# Patient Record
(demographics unavailable — no encounter records)

---

## 2024-11-05 NOTE — PROCEDURE
[FreeTextEntry1] : Patient was injected with Botox diluted 10 units/0.1cc. Neck region was cleaned with alcohol wipes. Injections were done using anatomical targets as follows:  Right sternocleidomastoid 60units divided in 2 spots  Right trapezius 30 units  Right splenius capitis 50 Right semispinalis capitis 20   Patient tolerated the procedure well, no complications. total injected 160 units wasted 40

## 2024-11-05 NOTE — PHYSICAL EXAM
[FreeTextEntry1] : On exam patient is awake and alert. He has mild hypertrophy of the right sternocleidomastoid. He has limited range of motion worse on the right. Strength is 5 x 5 in all extremities. Face is symmetric tongue and uvula midline and symmetric. No difficulty with tandem walk. Deep tendon reflexes are equal and symmetric.

## 2024-11-05 NOTE — DISCUSSION/SUMMARY
[FreeTextEntry1] : 62 year-old right-handed male with cervical dystonia, carpal tunnel syndrome and chronic lower back pain. He continues to receive benefit from Botox,   Patient is injected as above, no changes made today. All questions were addressed and answered Follow up in 3 months for repeat injections. We discussed the above impression, plan and recommendation during the visit. Counseling represented more than 50% of the 20-minute visit time

## 2024-11-05 NOTE — HISTORY OF PRESENT ILLNESS
[FreeTextEntry1] : The patient presented for Botox injections today for cervical dystonia. In past, he reported tightness and difficulty turning his head in both directions, right more than left. No side effects from the last injection. He reports being able to turn his head much better, particularly noticeable when he is driving. He also reports less pain in  the right shoulder region and base of the head on the right. EMG study mild carpal tunnel syndrome. no cervical radiculopathy. the effect of BTX has now worn off he presents for repeat BTX inj gabapentin 200mgs daily  interval history: December 15, 2021.  Good benefit from last inj, no side effects.  Botox is now worn off he presents for repeat injections.  Patient states that he is 15 days over 3 months and notices tingling sensations in his neck and scalp when Botox wears off.  Tolerating gabapentin without any side effects  Interval history-March 2, 2022.  Patient states he had good response to the last Botox injection however it lasted only 2 months.  Thereafter he he has difficulty turning his neck and also paresthesias on his scalp.  He feels like there is pressure on the top of his head.  When the symptoms happen he takes gabapentin and has been using about 400 mg a day.  He takes 200 mg twice a day.  States that if he takes it late at night it causes insomnia.  Denies any other complaints at present time  Interval history-January 10, 2023- patient states the last set of Botox injections worked very well and lasted a little less than 3 months.  There were no side effects.  He is using gabapentin consistently 200 mg a day.  He is exercising and overall feeling well.  He wishes to have repeat injections done today.  When Botox wears off he gets paresthesias in the right side of his head and difficulty moving his head side to side and finds it very difficult to the point that he cannot drive anymore.  He has tried increasing gabapentin to 300 mg and that helps with those symptoms, however if he takes the 300 mg 3 capsules together he feels stomach cramps  Interval history April 18, 2023 patient presents for treatment and management of cervical dystonia.  Requesting slightly higher dose as when the Botox wears off his symptoms return.  Takes gabapentin only once a day now.  Denies any other complaints  Interval history August 25, 2023.  Patient presents for treatment and management of cervical dystonia.  Higher dose of Botox worked better for him.  Just had some sensory tingling about 3 days ago takes gabapentin only once a day denies any other complaints at present time no side effects from Botox  may 28, 2024 patient present for the management of cervical dystonia.  States that the last set of injection worked well for him and wore off few days ago.  He takes gabapentin once a day.  Denies any side effects from Botox and wishes to have repeat injections done today.  Has a new baby granddaughter Ana Mayo nov 5, 2024: patient present for the management of cervical dystonia.  States that the last set of injection worked well for him and wore off 20 days ago.. Not taking gabapentin anymore.  Denies any side effects from Botox and wishes to have repeat injections done today.

## 2025-02-27 NOTE — DISCUSSION/SUMMARY
[FreeTextEntry1] : 62 year-old right-handed male with cervical dystonia, carpal tunnel syndrome and chronic lower back pain. He continues to receive benefit from Botox,   Patient is injected as above, no changes made today. Uses gabapentin as needed All questions were addressed and answered Follow up in 3 months for repeat injections. We discussed the above impression, plan and recommendation during the visit. Counseling represented more than 50% of the 20-minute visit time

## 2025-02-27 NOTE — HISTORY OF PRESENT ILLNESS
[FreeTextEntry1] : The patient presented for Botox injections today for cervical dystonia. In past, he reported tightness and difficulty turning his head in both directions, right more than left. No side effects from the last injection. He reports being able to turn his head much better, particularly noticeable when he is driving. He also reports less pain in  the right shoulder region and base of the head on the right. EMG study mild carpal tunnel syndrome. no cervical radiculopathy. the effect of BTX has now worn off he presents for repeat BTX inj gabapentin 200mgs daily  interval history: December 15, 2021.  Good benefit from last inj, no side effects.  Botox is now worn off he presents for repeat injections.  Patient states that he is 15 days over 3 months and notices tingling sensations in his neck and scalp when Botox wears off.  Tolerating gabapentin without any side effects  Interval history-March 2, 2022.  Patient states he had good response to the last Botox injection however it lasted only 2 months.  Thereafter he he has difficulty turning his neck and also paresthesias on his scalp.  He feels like there is pressure on the top of his head.  When the symptoms happen he takes gabapentin and has been using about 400 mg a day.  He takes 200 mg twice a day.  States that if he takes it late at night it causes insomnia.  Denies any other complaints at present time  Interval history-January 10, 2023- patient states the last set of Botox injections worked very well and lasted a little less than 3 months.  There were no side effects.  He is using gabapentin consistently 200 mg a day.  He is exercising and overall feeling well.  He wishes to have repeat injections done today.  When Botox wears off he gets paresthesias in the right side of his head and difficulty moving his head side to side and finds it very difficult to the point that he cannot drive anymore.  He has tried increasing gabapentin to 300 mg and that helps with those symptoms, however if he takes the 300 mg 3 capsules together he feels stomach cramps  Interval history April 18, 2023 patient presents for treatment and management of cervical dystonia.  Requesting slightly higher dose as when the Botox wears off his symptoms return.  Takes gabapentin only once a day now.  Denies any other complaints  Interval history August 25, 2023.  Patient presents for treatment and management of cervical dystonia.  Higher dose of Botox worked better for him.  Just had some sensory tingling about 3 days ago takes gabapentin only once a day denies any other complaints at present time no side effects from Botox  may 28, 2024 patient present for the management of cervical dystonia.  States that the last set of injection worked well for him and wore off few days ago.  He takes gabapentin once a day.  Denies any side effects from Botox and wishes to have repeat injections done today.  Has a new baby granddaughter Ana  Interval nov 5, 2024: patient present for the management of cervical dystonia.  States that the last set of injection worked well for him and wore off 20 days ago.. Not taking gabapentin anymore.  Denies any side effects from Botox and wishes to have repeat injections done today.   Interval history February 27, 2025.  Patient presents for Botox injections for cervical dystonia.  States that he is 4 months out and last month Botox wore off.  He started taking gabapentin to help with the pain.  States that otherwise the injections worked well and wishes to have repeat injections done today.

## 2025-05-21 NOTE — PROCEDURE
[FreeTextEntry1] : Patient was injected with Botox diluted 10 units/0.1cc. Neck region was cleaned with alcohol wipes. Injections were done using anatomical targets as follows:  Right sternocleidomastoid 70units divided in 2 spots (+10 c/w last inj) Right trapezius 40 units (+10 c/w last inj) Right splenius capitis 60 (+10 c/w last inj) Right semispinalis capitis 30  (+10 c/w last inj)  Patient tolerated the procedure well, no complications. total injected 200 units wasted 0

## 2025-05-21 NOTE — DISCUSSION/SUMMARY
[FreeTextEntry1] : 62 year-old right-handed male with cervical dystonia, carpal tunnel syndrome and chronic lower back pain. He continues to receive benefit from Botox,   Patient is injected as above,dose increased Uses gabapentin as needed, baclofen 10 bid as needed side effects of sedation discussed extensively All questions were addressed and answered Follow up in 3 months for repeat injections. We discussed the above impression, plan and recommendation during the visit. Counseling represented more than 50% of the 30-minute visit time

## 2025-05-21 NOTE — HISTORY OF PRESENT ILLNESS
[FreeTextEntry1] : The patient presented for Botox injections today for cervical dystonia. In past, he reported tightness and difficulty turning his head in both directions, right more than left. No side effects from the last injection. He reports being able to turn his head much better, particularly noticeable when he is driving. He also reports less pain in  the right shoulder region and base of the head on the right. EMG study mild carpal tunnel syndrome. no cervical radiculopathy. the effect of BTX has now worn off he presents for repeat BTX inj gabapentin 200mgs daily  interval history: December 15, 2021.  Good benefit from last inj, no side effects.  Botox is now worn off he presents for repeat injections.  Patient states that he is 15 days over 3 months and notices tingling sensations in his neck and scalp when Botox wears off.  Tolerating gabapentin without any side effects  Interval history-March 2, 2022.  Patient states he had good response to the last Botox injection however it lasted only 2 months.  Thereafter he he has difficulty turning his neck and also paresthesias on his scalp.  He feels like there is pressure on the top of his head.  When the symptoms happen he takes gabapentin and has been using about 400 mg a day.  He takes 200 mg twice a day.  States that if he takes it late at night it causes insomnia.  Denies any other complaints at present time  Interval history-January 10, 2023- patient states the last set of Botox injections worked very well and lasted a little less than 3 months.  There were no side effects.  He is using gabapentin consistently 200 mg a day.  He is exercising and overall feeling well.  He wishes to have repeat injections done today.  When Botox wears off he gets paresthesias in the right side of his head and difficulty moving his head side to side and finds it very difficult to the point that he cannot drive anymore.  He has tried increasing gabapentin to 300 mg and that helps with those symptoms, however if he takes the 300 mg 3 capsules together he feels stomach cramps  Interval history April 18, 2023 patient presents for treatment and management of cervical dystonia.  Requesting slightly higher dose as when the Botox wears off his symptoms return.  Takes gabapentin only once a day now.  Denies any other complaints  Interval history August 25, 2023.  Patient presents for treatment and management of cervical dystonia.  Higher dose of Botox worked better for him.  Just had some sensory tingling about 3 days ago takes gabapentin only once a day denies any other complaints at present time no side effects from Botox  may 28, 2024 patient present for the management of cervical dystonia.  States that the last set of injection worked well for him and wore off few days ago.  He takes gabapentin once a day.  Denies any side effects from Botox and wishes to have repeat injections done today.  Has a new baby granddaughter Ana  Interval nov 5, 2024: patient present for the management of cervical dystonia.  States that the last set of injection worked well for him and wore off 20 days ago.. Not taking gabapentin anymore.  Denies any side effects from Botox and wishes to have repeat injections done today.   Interval history February 27, 2025.  Patient presents for Botox injections for cervical dystonia.  States that he is 4 months out and last month Botox wore off.  He started taking gabapentin to help with the pain.  States that otherwise the injections worked well and wishes to have repeat injections done today.  Interval history May 21, 2025.  Patient presents for Botox injections for cervical dystonia.  States that the last injection lasted only 2 months and then he had return of symptoms.  No side effects requests a higher dose today.  He takes gabapentin 300 mg as needed to help with pain sometimes needs to take 2 or 3 doses and sometimes is still not effective